# Patient Record
Sex: MALE | Race: OTHER | Employment: FULL TIME | URBAN - METROPOLITAN AREA
[De-identification: names, ages, dates, MRNs, and addresses within clinical notes are randomized per-mention and may not be internally consistent; named-entity substitution may affect disease eponyms.]

---

## 2018-02-02 ENCOUNTER — APPOINTMENT (OUTPATIENT)
Dept: GENERAL RADIOLOGY | Age: 38
End: 2018-02-02
Attending: PHYSICIAN ASSISTANT
Payer: COMMERCIAL

## 2018-02-02 ENCOUNTER — HOSPITAL ENCOUNTER (EMERGENCY)
Age: 38
Discharge: HOME | End: 2018-02-02
Attending: EMERGENCY MEDICINE
Payer: COMMERCIAL

## 2018-02-02 VITALS
HEIGHT: 71 IN | RESPIRATION RATE: 14 BRPM | DIASTOLIC BLOOD PRESSURE: 85 MMHG | HEART RATE: 77 BPM | WEIGHT: 170 LBS | SYSTOLIC BLOOD PRESSURE: 130 MMHG | TEMPERATURE: 98.7 F | OXYGEN SATURATION: 98 % | BODY MASS INDEX: 23.8 KG/M2

## 2018-02-02 DIAGNOSIS — R07.9 CHEST PAIN, UNSPECIFIED TYPE: Primary | ICD-10-CM

## 2018-02-02 LAB
ATRIAL RATE: 72 BPM
ATRIAL RATE: 78 BPM
CALCULATED P AXIS, ECG09: 26 DEGREES
CALCULATED P AXIS, ECG09: 42 DEGREES
CALCULATED R AXIS, ECG10: 36 DEGREES
CALCULATED R AXIS, ECG10: 54 DEGREES
CALCULATED T AXIS, ECG11: 16 DEGREES
CALCULATED T AXIS, ECG11: 29 DEGREES
DIAGNOSIS, 93000: NORMAL
DIAGNOSIS, 93000: NORMAL
P-R INTERVAL, ECG05: 160 MS
P-R INTERVAL, ECG05: 176 MS
Q-T INTERVAL, ECG07: 348 MS
Q-T INTERVAL, ECG07: 358 MS
QRS DURATION, ECG06: 92 MS
QRS DURATION, ECG06: 98 MS
QTC CALCULATION (BEZET), ECG08: 392 MS
QTC CALCULATION (BEZET), ECG08: 396 MS
VENTRICULAR RATE, ECG03: 72 BPM
VENTRICULAR RATE, ECG03: 78 BPM

## 2018-02-02 PROCEDURE — 71046 X-RAY EXAM CHEST 2 VIEWS: CPT

## 2018-02-02 PROCEDURE — 74011250637 HC RX REV CODE- 250/637: Performed by: PHYSICIAN ASSISTANT

## 2018-02-02 PROCEDURE — 93005 ELECTROCARDIOGRAM TRACING: CPT

## 2018-02-02 PROCEDURE — 96372 THER/PROPH/DIAG INJ SC/IM: CPT

## 2018-02-02 PROCEDURE — 99284 EMERGENCY DEPT VISIT MOD MDM: CPT

## 2018-02-02 PROCEDURE — 74011250636 HC RX REV CODE- 250/636: Performed by: PHYSICIAN ASSISTANT

## 2018-02-02 RX ORDER — LIDOCAINE 50 MG/G
1 PATCH TOPICAL
Status: DISCONTINUED | OUTPATIENT
Start: 2018-02-02 | End: 2018-02-02 | Stop reason: HOSPADM

## 2018-02-02 RX ORDER — KETOROLAC TROMETHAMINE 30 MG/ML
30 INJECTION, SOLUTION INTRAMUSCULAR; INTRAVENOUS
Status: COMPLETED | OUTPATIENT
Start: 2018-02-02 | End: 2018-02-02

## 2018-02-02 RX ORDER — KETOROLAC TROMETHAMINE 30 MG/ML
30 INJECTION, SOLUTION INTRAMUSCULAR; INTRAVENOUS
Status: DISCONTINUED | OUTPATIENT
Start: 2018-02-02 | End: 2018-02-02

## 2018-02-02 RX ADMIN — KETOROLAC TROMETHAMINE 30 MG: 30 INJECTION, SOLUTION INTRAMUSCULAR at 08:57

## 2018-02-02 NOTE — ED NOTES
Patient given discharge instruction by Charles Erwin. Verbalized understanding, pt discharge home with friend.

## 2018-02-02 NOTE — ED PROVIDER NOTES
HPI Comments: 44 y.o. male with no significant past medical history presents with complaints of left sided chest pain which began after lifting a box this morning at work. The pt states that movement makes the pain worse. The pt rates the pain as a 6/10 in severity. There is no radiation of the pain. The pt describes the aching throbbing sensation. The pt denies taking anything at home for the discomfort. Pt denies pleuritic chest pain, hemoptysis, unilateral leg swelling, exogenous hormone supplements, hx of blood clot, recent trauma/surgery, or hx of CA. There are no other acute medical complaints at this time. PCP: Johnny Cedeño PA-C    Patient is a 44 y.o. male presenting with chest pain. Chest Pain (Angina)    Pertinent negatives include no abdominal pain, no back pain, no cough, no diaphoresis, no dizziness, no fever, no nausea, no numbness, no palpitations, no shortness of breath and no vomiting. History reviewed. No pertinent past medical history. History reviewed. No pertinent surgical history. History reviewed. No pertinent family history. Social History     Social History    Marital status: SINGLE     Spouse name: N/A    Number of children: N/A    Years of education: N/A     Occupational History    Not on file. Social History Main Topics    Smoking status: Never Smoker    Smokeless tobacco: Never Used    Alcohol use No    Drug use: Not on file    Sexual activity: Not on file     Other Topics Concern    Not on file     Social History Narrative    No narrative on file         ALLERGIES: Review of patient's allergies indicates no known allergies. Review of Systems   Constitutional: Negative for activity change, appetite change, diaphoresis and fever. HENT: Negative for ear discharge, ear pain, facial swelling, rhinorrhea, sore throat, tinnitus, trouble swallowing and voice change.     Eyes: Negative for photophobia, pain, discharge, redness and visual disturbance. Respiratory: Negative for cough, chest tightness, shortness of breath, wheezing and stridor. Cardiovascular: Positive for chest pain. Negative for palpitations. Gastrointestinal: Negative for abdominal pain, constipation, diarrhea, nausea and vomiting. Endocrine: Negative for polydipsia and polyuria. Genitourinary: Negative for dysuria, flank pain and hematuria. Musculoskeletal: Negative for arthralgias, back pain and myalgias. Skin: Negative for color change and rash. Neurological: Negative for dizziness, syncope, speech difficulty, light-headedness and numbness. Psychiatric/Behavioral: Negative for behavioral problems. Vitals:    02/02/18 0830 02/02/18 0919 02/02/18 0930 02/02/18 0945   BP: (!) 142/93 (!) 134/94 132/89 130/85   Pulse: 69 68 74 77   Resp: 21 18 14 14   Temp:       SpO2: 97% 97% 97% 98%   Weight:       Height:                Physical Exam   Constitutional: He is oriented to person, place, and time. He appears well-developed and well-nourished. No distress. HENT:   Head: Normocephalic and atraumatic. Eyes: Conjunctivae are normal. Pupils are equal, round, and reactive to light. Neck: Normal range of motion. Neck supple. Cardiovascular: Normal rate, regular rhythm and normal heart sounds. No murmurs, gallops, rubs, or clicks   Pulmonary/Chest: Effort normal and breath sounds normal. No respiratory distress. He has no wheezes. No Stridors or wheezes. No rales or rhonci. No accessory muscle usage. No nasal flaring. Abdominal: Soft. Bowel sounds are normal. He exhibits no distension. There is no tenderness. Musculoskeletal: Normal range of motion. Neurological: He is alert and oriented to person, place, and time. Skin: Skin is warm. He is not diaphoretic. MDM  Number of Diagnoses or Management Options  Chest pain, unspecified type:   Diagnosis management comments: Pt afebrile and nontoxic appearing.   Vitals, labs, physical exam, and imaging studies all unremarkable. HEART score 2. PERC negative. Low index of suspicion for PE, MI, PTX, esophageal rupture, dissection, AAA, pancreatitis, appendicitis, cholecystitis/cholagnitis, bowel obstruction/perforation, sepsis or any other acute life threatening condition. Will treat symptomatically and advise close follow up with family doctor. Reviewed treatment plan with attending and they agree. Gaetana Baumgarten        ED Course       Procedures     ED EKG interpretation:  Rhythm: normal sinus rhythm; and regular . Rate (approx.): 78 bpm; Note written by Gurney Carrel, Scribe, as dictated by Lizabeth Velasco PA-C 8:12 AM     ED EKG interpretation:  Rhythm: normal sinus rhythm; and regular .  Rate (approx.): 72 bpm; Note written by Gurney Carrel, Scribe, as dictated by Lizabeth Velasco PA-C 9:29 AM

## 2018-02-02 NOTE — DISCHARGE INSTRUCTIONS
Dolor de pecho: Instrucciones de cuidado - [ Chest Pain: Care Instructions ]  Instrucciones de cuidado    El dolor de pecho puede tener muchas causas. Algunas no son graves y mejorarán por sí solas en pocos días. [place]ney algunos tipos de dolor de pecho requieren más pruebas y [place]. Es posible que bain médico le haya recomendado rogelio visita de seguimiento dentro de las 8 a 12 horas siguientes. Si no mejora, es posible que necesite [address] pruebas o [place]. Aunque bain médico le haya dado de tatiana, es necesario que esté atento a cualquier problema que se presente. El médico le hizo un cuidadoso chequeo, rodney a veces los problemas pueden aparecer posteriormente. Si tiene nuevos síntomas o éstos no mejoran, obtenga atención médica de inmediato. Si tiene dolor o presión en el pecho que empeora o es diferente y que dura más de 5 minutos, o se desmayó (perdió el conocimiento), llame al 911 o busque otra ayuda de emergencia de inmediato. Acudir a rogelio consulta médica es sólo un paso en bain tratamiento. Aunque se sienta mejor, todavía deberá hacer lo que bain médico le recomiende, panchito asistir a todas las visitas de seguimiento sugeridas y jesus los medicamentos exactamente [employer]	[place] fueron indicados. [place] le ayudará a recuperarse y prevenir problemas futuros. ¿Cómo puede cuidarse en el hogar? · Descanse hasta que se sienta mejor. · [place] mayelin medicamentos exactamente panchito le fueron recetados. Llame a bain médico si wes estar teniendo problemas con bain medicamento. · No conduzca después de jesus un analgésico (medicamento para el dolor) recetado. ¿Cuándo debe pedir ayuda? Llame al 911 si:  ? · Se desmayó (perdió el conocimiento). ? · Tiene graves dificultades para respirar. ? · Tiene síntomas de un ataque al corazón. Estos podrían incluir:  ¨ Dolor o presión en el pecho, o rogelio sensación extraña en el pecho. ¨ Sudoración. ¨ Falta de aire. ¨ Náuseas o vómito.   ¨ Dolor, presión o rogelio sensación extraña en la espalda, el taniya, la mandíbula, la parte superior del abdomen o en iris o ambos hombros o brazos. ¨ Aturdimiento o debilidad repentina. ¨ Latidos del corazón rápidos o irregulares. Después de llamar al 911, es posible que el operador le diga que mastique 1 aspirina para adultos o de 2 a 4 aspirinas de dosis baja. Espere a rogelio ambulancia. No intente conducir usted mismo. ? Llame hoy a bain médico si:  ? · Tiene cualquier dificultad para respirar. ? · El dolor en el pecho empeora. ? · [employer] o aturdimiento, o que está a punto de [place]. ? · No mejora panchito se esperaba. ? · Tiene dolor de pecho nuevo o diferente. ¿Dónde puede encontrar más información en inglés? Arzate Filler a http://roslyn-latia.info/. Escriba A120 en la búsqueda para aprender más acerca de \"Dolor de pecho: Instrucciones de cuidado - [ Chest Pain: Care Instructions ]. \"  Revisado: [address] 2017  Versión del contenido: 11.4  © 1922-7417 Healthwise, Incorporated. Las instrucciones de cuidado fueron adaptadas bajo licencia por [employer] (which disclaims liability or warranty for this information). Si usted tiene [place] afección médica o sobre estas instrucciones, siempre pregunte a bain profesional de iva. Healthwise, Incorporated niega toda garantía o responsabilidad por bain uso de esta información. We hope that we have addressed all of your medical concerns. The examination and treatment you received in the Emergency Department were for an emergent problem and were not intended as complete care. It is important that you follow up with your healthcare provider(s) for ongoing care. If your symptoms worsen or do not improve as expected, and you are unable to reach your usual health care provider(s), you should return to the Emergency Department.       Today's healthcare is undergoing tremendous change, and patient satisfaction surveys are one of the many tools to assess the quality of medical care. You may receive a survey from the [employer] organization regarding your experience in the Emergency Department. I hope that your experience has been completely positive, particularly the medical care that I provided. As such, please participate in the survey; anything less than excellent does not meet my expectations or intentions. [facility] and [address] participate in nationally recognized quality of care measures. If your blood pressure is greater than 120/80, as reported below, we urge that you seek medical care to address the potential of high blood pressure, commonly known as hypertension. Hypertension can be hereditary or can be caused by certain medical conditions, pain, stress, or \"white coat syndrome. \"       Please make an appointment with your health care provider(s) for follow up of your Emergency Department visit. VITALS:   Patient Vitals for the past 8 hrs:   Temp Pulse Resp BP SpO2   02/02/18 0830 - 69 21 (!) 142/93 97 %   02/02/18 0808 98.7 °F (37.1 °C) 76 16 (!) 170/100 98 %          Thank you for allowing us to provide you with medical care today. We realize that you have many choices for your emergency care needs. Please choose us in the future for any continued health care needs. Carrie Vasquez [place], [address] [facility] [place]: [phone]            No results found for this or any previous visit (from the past 24 hour(s)). Xr Chest Pa Lat    Result Date: 2/2/2018  Exam:  2 view chest Indication: Chest pain this morning PA and lateral views demonstrate normal heart size. There is no acute process in the lung fields. The osseous structures are unremarkable. Impression: No acute process.

## 2021-05-02 ENCOUNTER — APPOINTMENT (OUTPATIENT)
Dept: ULTRASOUND IMAGING | Age: 41
End: 2021-05-02
Attending: EMERGENCY MEDICINE
Payer: COMMERCIAL

## 2021-05-02 ENCOUNTER — HOSPITAL ENCOUNTER (EMERGENCY)
Age: 41
Discharge: HOME | End: 2021-05-02
Attending: EMERGENCY MEDICINE
Payer: COMMERCIAL

## 2021-05-02 VITALS
WEIGHT: 201.5 LBS | SYSTOLIC BLOOD PRESSURE: 179 MMHG | RESPIRATION RATE: 18 BRPM | OXYGEN SATURATION: 99 % | DIASTOLIC BLOOD PRESSURE: 112 MMHG | BODY MASS INDEX: 30.54 KG/M2 | HEIGHT: 68 IN | HEART RATE: 84 BPM

## 2021-05-02 DIAGNOSIS — M54.42 ACUTE LEFT-SIDED LOW BACK PAIN WITH LEFT-SIDED SCIATICA: Primary | ICD-10-CM

## 2021-05-02 LAB
APPEARANCE UR: ABNORMAL
BACTERIA URNS QL MICRO: NEGATIVE /HPF
BILIRUB UR QL: NEGATIVE
COLOR UR: ABNORMAL
EPITH CASTS URNS QL MICRO: ABNORMAL /LPF
GLUCOSE UR STRIP.AUTO-MCNC: NEGATIVE MG/DL
HGB UR QL STRIP: NEGATIVE
HYALINE CASTS URNS QL MICRO: ABNORMAL /LPF (ref 0–5)
KETONES UR QL STRIP.AUTO: NEGATIVE MG/DL
LEUKOCYTE ESTERASE UR QL STRIP.AUTO: NEGATIVE
NITRITE UR QL STRIP.AUTO: NEGATIVE
PH UR STRIP: 6.5 [PH] (ref 5–8)
PROT UR STRIP-MCNC: NEGATIVE MG/DL
RBC #/AREA URNS HPF: ABNORMAL /HPF (ref 0–5)
SP GR UR REFRACTOMETRY: 1.01 (ref 1–1.03)
UR CULT HOLD, URHOLD: NORMAL
UROBILINOGEN UR QL STRIP.AUTO: 1 EU/DL (ref 0.2–1)
WBC URNS QL MICRO: ABNORMAL /HPF (ref 0–4)

## 2021-05-02 PROCEDURE — 87491 CHLMYD TRACH DNA AMP PROBE: CPT

## 2021-05-02 PROCEDURE — 76870 US EXAM SCROTUM: CPT

## 2021-05-02 PROCEDURE — 81001 URINALYSIS AUTO W/SCOPE: CPT

## 2021-05-02 PROCEDURE — 96372 THER/PROPH/DIAG INJ SC/IM: CPT

## 2021-05-02 PROCEDURE — 99282 EMERGENCY DEPT VISIT SF MDM: CPT

## 2021-05-02 PROCEDURE — 74011250636 HC RX REV CODE- 250/636: Performed by: EMERGENCY MEDICINE

## 2021-05-02 RX ORDER — KETOROLAC TROMETHAMINE 30 MG/ML
60 INJECTION, SOLUTION INTRAMUSCULAR; INTRAVENOUS
Status: COMPLETED | OUTPATIENT
Start: 2021-05-02 | End: 2021-05-02

## 2021-05-02 RX ORDER — IBUPROFEN 600 MG/1
600 TABLET ORAL
Qty: 30 TAB | Refills: 0 | Status: SHIPPED | OUTPATIENT
Start: 2021-05-02

## 2021-05-02 RX ORDER — METHOCARBAMOL 750 MG/1
750 TABLET, FILM COATED ORAL 4 TIMES DAILY
Qty: 28 TAB | Refills: 0 | Status: SHIPPED | OUTPATIENT
Start: 2021-05-02 | End: 2021-05-09

## 2021-05-02 RX ADMIN — KETOROLAC TROMETHAMINE 60 MG: 30 INJECTION, SOLUTION INTRAMUSCULAR; INTRAVENOUS at 09:19

## 2021-05-02 NOTE — ED TRIAGE NOTES
Pt arrives with complaints of back pain that shoots down his left testicle and left leg after lifting heavy object at work 2 weeks ago. Pt reports pain is worse today.

## 2021-05-05 LAB
C TRACH RRNA SPEC QL NAA+PROBE: NEGATIVE
N GONORRHOEA RRNA SPEC QL NAA+PROBE: NEGATIVE
PLEASE NOTE:, 188601: NORMAL
SPECIMEN SOURCE: NORMAL